# Patient Record
(demographics unavailable — no encounter records)

---

## 2025-02-03 NOTE — HISTORY OF PRESENT ILLNESS
[de-identified] : 72 year old female with HTN, HLD, hypothyroidism and CVA s/p ILR, who presents for follow up.  She was admitted with a CVA 11/2023.  Echo / IAN unremarkable.  s/p ILR.  She presents for routine follow up and has no device related complaints.  No palpitations, chest pain, SOB, syncope, edema.

## 2025-02-03 NOTE — DISCUSSION/SUMMARY
[FreeTextEntry1] :  72 year old female with HTN, HLD, hypothyroidism and CVA s/p ILR, who presents for follow up.  ILR interrogation with no events for review..  Remote monitoring working. We discussed the reasoning for the ILR and how home monitoring works / the limitations. She will follow up in 1 year or sooner if needed.  She knows to call with any questions or concerns.

## 2025-02-03 NOTE — HISTORY OF PRESENT ILLNESS
[de-identified] : 72 year old female with HTN, HLD, hypothyroidism and CVA s/p ILR, who presents for follow up.  She was admitted with a CVA 11/2023.  Echo / IAN unremarkable.  s/p ILR.  She presents for routine follow up and has no device related complaints.  No palpitations, chest pain, SOB, syncope, edema.

## 2025-02-03 NOTE — ADDENDUM
[FreeTextEntry1] : I, Víctor Celeste, hereby attest that the medical record entry for this patient accurately reflects signatures/notations that I made on the Date of Service in my capacity as an Attending Physician when I treated/diagnosed the above patient. I do hereby attest that this information is true, accurate and complete to the best of my knowledge and I understand that any falsification, omission, or concealment of material fact may subject me to administrative, civil, or, criminal liability. I agree with the note as written by my PA in its entirety. I was present for the entire visit and supervised the entire visit and agree with the plan as outlined.  I, John Valentin, am scribing for and the presence of Dr. Celeste the following sections: HPI, PMH,Family/social history, ROS, Physical Exam, Assessment / Plan.

## 2025-02-03 NOTE — REVIEW OF SYSTEMS
[Weakness] : weakness [Negative] : Heme/Lymph [Fever] : no fever [Weight Gain (___ Lbs)] : no recent weight gain [Chills] : no chills [Weight Loss (___ Lbs)] : no recent weight loss [SOB] : no shortness of breath [Dyspnea on exertion] : not dyspnea during exertion [Lower Ext Edema] : no extremity edema [Palpitations] : no palpitations [Syncope] : no syncope [Cough] : no cough

## 2025-02-03 NOTE — PHYSICAL EXAM
[General Appearance - Well Developed] : well developed [Normal Appearance] : normal appearance [Well Groomed] : well groomed [General Appearance - Well Nourished] : well nourished [No Deformities] : no deformities [General Appearance - In No Acute Distress] : no acute distress [Heart Sounds] : normal S1 and S2 [Heart Rate And Rhythm] : heart rate and rhythm were normal [] : no respiratory distress [Respiration, Rhythm And Depth] : normal respiratory rhythm and effort [Exaggerated Use Of Accessory Muscles For Inspiration] : no accessory muscle use [Auscultation Breath Sounds / Voice Sounds] : lungs were clear to auscultation bilaterally [Clean] : clean [Dry] : dry [Well-Healed] : well-healed [Palpable Crepitus] : no palpable crepitus [Bleeding] : no active bleeding [Foul Odor] : no foul smell [Purulent Drainage] : no purulent drainage [Serosanguineous Drainage] : no serosanquineous drainage [Serous Drainage] : no serous drainage [Erythema] : not erythematous [Warm] : not warm [Tender] : not tender [Indurated] : not indurated [Fluctuant] : not fluctuant

## 2025-02-03 NOTE — HISTORY OF PRESENT ILLNESS
[de-identified] : 72 year old female with HTN, HLD, hypothyroidism and CVA s/p ILR, who presents for follow up.  She was admitted with a CVA 11/2023.  Echo / IAN unremarkable.  s/p ILR.  She presents for routine follow up and has no device related complaints.  No palpitations, chest pain, SOB, syncope, edema.